# Patient Record
(demographics unavailable — no encounter records)

---

## 2025-07-17 NOTE — HISTORY OF PRESENT ILLNESS
[Gradual] : gradual [0] : 0 [7] : 7 [Diffuse] : diffuse [Dull/Aching] : dull/aching [Constant] : constant [Household chores] : household chores [Leisure] : leisure [Rest] : rest [de-identified] : Patient presents today for evaluation of the bilateral hips with pain ongoing for 2 years, ONLY WHEN SHE IS ON HORSEBACK. Patient reports no pain at rest, standing, walking however significant lateral hip pain when she is riding. Patient denies any prior formal treatment at this time and reports that she has been stretching regularly without relief. Patient reports that she does not take any medication for pain as she has none once she is off the horse. Ambulating without support. ***Patient does note that she had PELVIS Fx 50 years ago after horse landed on her, 6 weeks traction, denies Sx and reports no issues since until 2 years ago*** [] : Post Surgical Visit: no [FreeTextEntry1] : Bilateral Hips [FreeTextEntry3] : 2 years [FreeTextEntry5] : NKI [de-identified] : Movement

## 2025-07-17 NOTE — PHYSICAL EXAM
[de-identified] : Examination of the bilateral hips is as follows:  INSPECTION: no swelling, no ecchymosis, no erythema, no scars, no palpable masses.  PALPATION: tenderness, greater trochanteric tenderness.  ROM: pain with hip abduction, but flexion 120 degrees, extension 30 degrees, adduction 35 degrees, abduction 45 degrees, external rotation 45 degrees, internal rotation 45 degrees.  STRENGTH: flexion 5/5, extension 5/5, abduction 5/5, adduction 5/5.  VASCULAR: dorsalis pedis pulse: 2+, posterior tibialis pulse: 2+.  NEURO: motor exam 5/5 throughout, light touch intact throughout, no focal motor deficits.  GAIT: non-antalgic, patient ambulates without assistive device.  X-rays of bilateral hips is as follows: Hip with pelvis 2 view in the anteroposterior, lateral views: Mild to moderate hip djd, high offset, varus alignment. There are no fractures, subluxations or dislocations. No significant abnormalities are seen.

## 2025-07-17 NOTE — ASSESSMENT
[FreeTextEntry1] : 69 yo female presenting today with bilateral greater trochanteric hip bursitis, mild to moderate bilateral hip djd. Recommend non-surgical managemnet -Discussed with patient that in the future when the pain worsens that she may be indicated for GUANACO, understanding expressed -Discussed with patient risks, benefits, alternatives of bilateral greater trochanteric hip bursal csi, patient tolerated well -Activities as tolerated -Rest, ice, compression, elevation, NSAIDs PRN for pain. -All questions answered -F/u 6 weeks   The diagnosis was explained in detail. The potential non-surgical and surgical treatments were reviewed. The relative risks and benefits of each option were considered relative to the patients age, activity level, medical history, symptom severity and previously attempted treatments.   The patient was advised to consult with their primary medical provider prior to initiation of any new medications to reduce the risk of adverse effects specific to their long-term home medications and medical history.   The patient's current medications management of their orthopedic diagnosis was reviewed today:   1) We discussed a comprehensive treatment plan that included possible pharmaceutical management involving the use of prescription strength medications including but not limited to options as oral Naprosyn 500mg BID, once daily Meloxicam 15 mg, or 500-650 mg Tylenol versus over-the-counter oral medications and topical prescriptions NSAID Pennsaid vs over the counter Voltaren gel.   2) There is a moderate risk of morbidity with further treatment, especially from use of prescription strength medications and possible side effects of these medications which include upset stomach with oral medications, skin reactions to topical medications and cardiac/renal issues with long term use.   3) I recommended that the patient follow-up with their medical physician to discuss any significant specific potential issues with long term medication use such as interactions with current medications or with exacerbation of underlying medical comorbidities.   4) The benefits and risks associated with use of injectable, oral or topical, prescription and over the counter anti-inflammatory medications were discussed with the patient. The patient voiced understanding of the risks including but not limited to bleeding, stroke, kidney dysfunction, heart disease, and were referred to the black box warning label for further information  Entered by Robin Mendoza PA-C acting as scribe. Dr. Arredondo Attestation The documentation recorded by the scribe, in my presence, accurately reflects the service I, Dr. Arredondo, personally performed, and the decisions made by me with my edits as appropriate.

## 2025-07-17 NOTE — PROCEDURE
[FreeTextEntry3] : Large joint injection was performed of bilateral greater trochanteric bursa. The indication for this procedure was pain and inflammation. The site was prepped with alcohol and sterile technique used. An injection of was used Betamethasone (Celestone) 5cc of 6mg. Patient tolerated procedure well. Patient was advised to call if redness, pain or fever occur and apply ice for 15 minutes out of every hour for the next 12-24 hours as tolerated. Patient has tried OTC's including aspirin, Ibuprofen, Aleve, etc or prescription NSAIDS, and/or exercises at home and/or physical therapy without satisfactory response, patient had decreased mobility in the joint and the risks benefits, and alternatives have been discussed, and verbal consent was obtained.